# Patient Record
Sex: MALE | Race: WHITE | NOT HISPANIC OR LATINO | URBAN - METROPOLITAN AREA
[De-identification: names, ages, dates, MRNs, and addresses within clinical notes are randomized per-mention and may not be internally consistent; named-entity substitution may affect disease eponyms.]

---

## 2017-02-20 ENCOUNTER — APPOINTMENT (RX ONLY)
Dept: URBAN - METROPOLITAN AREA CLINIC 7 | Facility: CLINIC | Age: 47
Setting detail: DERMATOLOGY
End: 2017-02-20

## 2017-02-20 DIAGNOSIS — L85.8 OTHER SPECIFIED EPIDERMAL THICKENING: ICD-10-CM

## 2017-02-20 DIAGNOSIS — L30.1 DYSHIDROSIS [POMPHOLYX]: ICD-10-CM

## 2017-02-20 PROCEDURE — 99213 OFFICE O/P EST LOW 20 MIN: CPT

## 2017-02-20 PROCEDURE — ? PRESCRIPTION

## 2017-02-20 PROCEDURE — ? COUNSELING

## 2017-02-20 RX ORDER — HALOBETASOL PROPIONATE 0.5 MG/G
1 OINTMENT TOPICAL TWICE DAILY
Qty: 1 | Refills: 2 | Status: ERX | COMMUNITY
Start: 2017-02-20

## 2017-02-20 RX ORDER — UREA 400 MG/G
1 CREAM TOPICAL QHS
Qty: 1 | Refills: 3 | Status: ERX | COMMUNITY
Start: 2017-02-20

## 2017-02-20 RX ADMIN — HALOBETASOL PROPIONATE 1: 0.5 OINTMENT TOPICAL at 00:00

## 2017-02-20 RX ADMIN — UREA 1: 400 CREAM TOPICAL at 00:00

## 2017-02-20 ASSESSMENT — LOCATION ZONE DERM: LOCATION ZONE: HAND

## 2017-02-20 ASSESSMENT — LOCATION DETAILED DESCRIPTION DERM
LOCATION DETAILED: LEFT RADIAL PALM
LOCATION DETAILED: RIGHT RADIAL PALM

## 2017-02-20 ASSESSMENT — LOCATION SIMPLE DESCRIPTION DERM
LOCATION SIMPLE: RIGHT HAND
LOCATION SIMPLE: LEFT HAND

## 2017-02-20 NOTE — HPI: DRY SKIN
How Severe Is Your Dry Skin?: moderate
Additional History: Was given tacrolimus by PCP. Patient states topical made it worse

## 2017-05-24 ENCOUNTER — APPOINTMENT (RX ONLY)
Dept: URBAN - METROPOLITAN AREA CLINIC 7 | Facility: CLINIC | Age: 47
Setting detail: DERMATOLOGY
End: 2017-05-24

## 2017-05-24 DIAGNOSIS — Z87.891 PERSONAL HISTORY OF NICOTINE DEPENDENCE: ICD-10-CM

## 2017-05-24 DIAGNOSIS — L30.1 DYSHIDROSIS [POMPHOLYX]: ICD-10-CM

## 2017-05-24 DIAGNOSIS — Z71.89 OTHER SPECIFIED COUNSELING: ICD-10-CM

## 2017-05-24 PROCEDURE — ? TREATMENT REGIMEN

## 2017-05-24 PROCEDURE — ? COUNSELING

## 2017-05-24 PROCEDURE — 99213 OFFICE O/P EST LOW 20 MIN: CPT

## 2017-05-24 PROCEDURE — ? PRESCRIPTION

## 2017-05-24 RX ORDER — EMOLLIENT COMBINATION NO.53
1 CREAM (GRAM) TOPICAL QD
Qty: 1 | Refills: 3 | Status: ERX

## 2017-05-24 RX ORDER — EMOL53/SOD MAG FL.SIL/CYCLOMET
1 KIT, CREAM AND GEL TOPICAL QD
Qty: 1 | Refills: 3 | Status: ERX

## 2017-05-24 RX ORDER — EMOLLIENT COMBINATION NO.53
1 CREAM (GRAM) TOPICAL QD
Qty: 1 | Refills: 3 | Status: ERX | COMMUNITY
Start: 2017-05-24

## 2017-05-24 RX ORDER — EMOL53/SOD MAG FL.SIL/CYCLOMET
1 KIT, CREAM AND GEL TOPICAL QD
Qty: 1 | Refills: 3 | Status: ERX | COMMUNITY
Start: 2017-05-24

## 2017-05-24 RX ADMIN — Medication 1: at 00:00

## 2017-05-24 ASSESSMENT — LOCATION SIMPLE DESCRIPTION DERM
LOCATION SIMPLE: RIGHT HAND
LOCATION SIMPLE: LEFT HAND

## 2017-05-24 ASSESSMENT — LOCATION DETAILED DESCRIPTION DERM
LOCATION DETAILED: RIGHT THENAR EMINENCE
LOCATION DETAILED: LEFT THENAR EMINENCE

## 2017-05-24 ASSESSMENT — LOCATION ZONE DERM: LOCATION ZONE: HAND

## 2017-05-24 NOTE — PROCEDURE: TREATMENT REGIMEN
Initiate Treatment: TAC 0.1% oint BID x 2 wks on and 2 wks off\\nHPR Plus Cream\\nHPR HydroGel Kit
Detail Level: Zone
Plan: Discussed patch testing. Gentle skincare handout given
Continue Regimen: Aquaphor
Samples Given: CeraVe gentle Cleansing bar\\nAveeno eczema therapy\\nAveeno intense moisture